# Patient Record
Sex: MALE | Race: WHITE | NOT HISPANIC OR LATINO | URBAN - NONMETROPOLITAN AREA
[De-identification: names, ages, dates, MRNs, and addresses within clinical notes are randomized per-mention and may not be internally consistent; named-entity substitution may affect disease eponyms.]

---

## 2021-05-27 ENCOUNTER — OFFICE VISIT (OUTPATIENT)
Dept: URBAN - NONMETROPOLITAN AREA CLINIC 2 | Facility: CLINIC | Age: 76
End: 2021-05-27
Payer: MEDICARE

## 2021-05-27 ENCOUNTER — WEB ENCOUNTER (OUTPATIENT)
Dept: URBAN - NONMETROPOLITAN AREA CLINIC 2 | Facility: CLINIC | Age: 76
End: 2021-05-27

## 2021-05-27 VITALS
BODY MASS INDEX: 42.43 KG/M2 | TEMPERATURE: 98.9 F | DIASTOLIC BLOOD PRESSURE: 69 MMHG | WEIGHT: 264 LBS | HEART RATE: 81 BPM | HEIGHT: 66 IN | SYSTOLIC BLOOD PRESSURE: 133 MMHG

## 2021-05-27 DIAGNOSIS — N18.30 STAGE 3 CHRONIC KIDNEY DISEASE, UNSPECIFIED WHETHER STAGE 3A OR 3B CKD: ICD-10-CM

## 2021-05-27 DIAGNOSIS — Z87.19 HISTORY OF HEMORRHOIDS: ICD-10-CM

## 2021-05-27 DIAGNOSIS — R59.9 LYMPH NODE ENLARGEMENT: ICD-10-CM

## 2021-05-27 DIAGNOSIS — I48.91 ATRIAL FIBRILLATION, UNSPECIFIED TYPE: ICD-10-CM

## 2021-05-27 DIAGNOSIS — R19.4 CHANGE IN BOWEL HABITS: ICD-10-CM

## 2021-05-27 DIAGNOSIS — R14.0 GASSINESS: ICD-10-CM

## 2021-05-27 DIAGNOSIS — K21.9 GASTROESOPHAGEAL REFLUX DISEASE, UNSPECIFIED WHETHER ESOPHAGITIS PRESENT: ICD-10-CM

## 2021-05-27 PROCEDURE — 99203 OFFICE O/P NEW LOW 30 MIN: CPT | Performed by: INTERNAL MEDICINE

## 2021-05-27 RX ORDER — ATORVASTATIN CALCIUM 10 MG/1
1 TABLET TABLET, FILM COATED ORAL ONCE A DAY
Qty: 30 | Status: ACTIVE | COMMUNITY
Start: 2021-05-28

## 2021-05-27 RX ORDER — OMEPRAZOLE 40 MG/1
1 CAPSULE 30 MINUTES BEFORE MORNING MEAL CAPSULE, DELAYED RELEASE ORAL ONCE A DAY
Qty: 30 | Status: ACTIVE | COMMUNITY
Start: 2021-05-28

## 2021-05-27 RX ORDER — APIXABAN 5 MG/1
AS DIRECTED TABLET, FILM COATED ORAL
Status: ACTIVE | COMMUNITY
Start: 2021-05-28

## 2021-05-27 RX ORDER — LISINOPRIL 40 MG/1
1 TABLET TABLET ORAL ONCE A DAY
Qty: 30 | Status: ACTIVE | COMMUNITY
Start: 2021-05-28

## 2021-05-27 NOTE — HPI-TODAY'S VISIT:
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 a chest CT 4/30/2021 showed an enlarged R cardiophrenic lymph node measuring 17 x 24 mm which was an increase c/w a 9/8/2010 study  showing it to be 12 x 21 mm. pt has several GI c/os has gerd. egd per dr germain 7/2020. was told he had gerd and "we need to keep an eye on it" on omeprazole 40mg qd. has break through 2/7 days. has c/o of gassiness and "rumbling" through his abdomen. no rx for it or w/u. has a change in bms for approx one month. has 2-4 bms/d which are now softer and lighter in color. he has a hx of hemorrhoids and is s/p RBL some recent bld per rectum. colon approx 2014 per dr franco was nl.

## 2021-05-28 PROBLEM — 116289008: Status: ACTIVE | Noted: 2021-05-27

## 2021-05-28 PROBLEM — 16851191000119103: Status: ACTIVE | Noted: 2021-05-27

## 2021-05-28 PROBLEM — 129851009: Status: ACTIVE | Noted: 2021-05-27

## 2021-06-10 PROBLEM — 30746006: Status: ACTIVE | Noted: 2021-05-27

## 2021-07-16 ENCOUNTER — OFFICE VISIT (OUTPATIENT)
Dept: URBAN - METROPOLITAN AREA MEDICAL CENTER 1 | Facility: MEDICAL CENTER | Age: 76
End: 2021-07-16
Payer: MEDICARE

## 2021-07-16 DIAGNOSIS — R93.3 ABN FINDINGS-GI TRACT: ICD-10-CM

## 2021-07-16 PROCEDURE — 43259 EGD US EXAM DUODENUM/JEJUNUM: CPT | Performed by: INTERNAL MEDICINE

## 2021-10-13 ENCOUNTER — OFFICE VISIT (OUTPATIENT)
Dept: URBAN - NONMETROPOLITAN AREA CLINIC 2 | Facility: CLINIC | Age: 76
End: 2021-10-13
Payer: MEDICARE

## 2021-10-13 DIAGNOSIS — N18.30 STAGE 3 CHRONIC KIDNEY DISEASE, UNSPECIFIED WHETHER STAGE 3A OR 3B CKD: ICD-10-CM

## 2021-10-13 DIAGNOSIS — I48.91 ATRIAL FIBRILLATION, UNSPECIFIED TYPE: ICD-10-CM

## 2021-10-13 DIAGNOSIS — R59.9 LYMPH NODE ENLARGEMENT: ICD-10-CM

## 2021-10-13 DIAGNOSIS — K21.9 GASTROESOPHAGEAL REFLUX DISEASE, UNSPECIFIED WHETHER ESOPHAGITIS PRESENT: ICD-10-CM

## 2021-10-13 DIAGNOSIS — R14.0 GASSINESS: ICD-10-CM

## 2021-10-13 DIAGNOSIS — Z87.19 HISTORY OF HEMORRHOIDS: ICD-10-CM

## 2021-10-13 DIAGNOSIS — R19.4 CHANGE IN BOWEL HABITS: ICD-10-CM

## 2021-10-13 PROCEDURE — 99214 OFFICE O/P EST MOD 30 MIN: CPT | Performed by: INTERNAL MEDICINE

## 2021-10-13 RX ORDER — ATORVASTATIN CALCIUM 10 MG/1
1 TABLET TABLET, FILM COATED ORAL ONCE A DAY
Qty: 30 | Status: ACTIVE | COMMUNITY
Start: 2021-05-28

## 2021-10-13 RX ORDER — LISINOPRIL 40 MG/1
1 TABLET TABLET ORAL ONCE A DAY
Qty: 30 | Status: ACTIVE | COMMUNITY
Start: 2021-05-28

## 2021-10-13 RX ORDER — APIXABAN 5 MG/1
AS DIRECTED TABLET, FILM COATED ORAL
Status: ACTIVE | COMMUNITY
Start: 2021-05-28

## 2021-10-13 RX ORDER — OMEPRAZOLE 40 MG/1
1 CAPSULE 30 MINUTES BEFORE MORNING MEAL CAPSULE, DELAYED RELEASE ORAL ONCE A DAY
Qty: 30 | Status: ACTIVE | COMMUNITY
Start: 2021-05-28

## 2021-10-13 NOTE — HPI-TODAY'S VISIT:
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 a chest CT 4/30/2021 showed an enlarged R cardiophrenic lymph node measuring 17 x 24 mm which was an increase c/w a 9/8/2010 study  showing it to be 12 x 21 mm. pt has several GI c/os has gerd. egd per dr germain 7/2020. was told he had gerd and "we need to keep an eye on it" on omeprazole 40mg qd. has break through 2/7 days. has c/o of gassiness and "rumbling" through his abdomen. no rx for it or w/u. has a change in bms for approx one month. has 2-4 bms/d which are now softer and lighter in color. he has a hx of hemorrhoids and is s/p RBL some recent bld per rectum. colon approx 2014 per dr franco was nl. 10/13/2021 Mr. Marshall returns for follow-up. He was initially referred by pulmonary for slightly enlarged cardiophrenic lymph node.  I performed EGD and EUS.  EGD was essentially normal.  EUS showed a vague hypodensity in the cardiophrenic area measuring approximately 1.4 cm in diameter suggestive of a nonpathologic lymph node.  I did not FNA this.  He is here today for follow-up.  He has no GI issues other than gassiness and bloating which she has had for some time.  He denies any shortness of breath or dysphagia.

## 2021-12-29 ENCOUNTER — OFFICE VISIT (OUTPATIENT)
Dept: URBAN - NONMETROPOLITAN AREA CLINIC 2 | Facility: CLINIC | Age: 76
End: 2021-12-29
Payer: MEDICARE

## 2021-12-29 DIAGNOSIS — I48.91 ATRIAL FIBRILLATION, UNSPECIFIED TYPE: ICD-10-CM

## 2021-12-29 DIAGNOSIS — Z87.19 HISTORY OF HEMORRHOIDS: ICD-10-CM

## 2021-12-29 DIAGNOSIS — R59.9 LYMPH NODE ENLARGEMENT: ICD-10-CM

## 2021-12-29 DIAGNOSIS — R19.4 CHANGE IN BOWEL HABITS: ICD-10-CM

## 2021-12-29 DIAGNOSIS — K21.9 GASTROESOPHAGEAL REFLUX DISEASE, UNSPECIFIED WHETHER ESOPHAGITIS PRESENT: ICD-10-CM

## 2021-12-29 DIAGNOSIS — N18.30 STAGE 3 CHRONIC KIDNEY DISEASE, UNSPECIFIED WHETHER STAGE 3A OR 3B CKD: ICD-10-CM

## 2021-12-29 DIAGNOSIS — R14.0 GASSINESS: ICD-10-CM

## 2021-12-29 PROCEDURE — 99214 OFFICE O/P EST MOD 30 MIN: CPT | Performed by: INTERNAL MEDICINE

## 2021-12-29 RX ORDER — LISINOPRIL 40 MG/1
1 TABLET TABLET ORAL ONCE A DAY
Qty: 30 | Status: ACTIVE | COMMUNITY
Start: 2021-05-28

## 2021-12-29 RX ORDER — OMEPRAZOLE 40 MG/1
1 CAPSULE 30 MINUTES BEFORE MORNING MEAL CAPSULE, DELAYED RELEASE ORAL ONCE A DAY
Qty: 30 | Status: ACTIVE | COMMUNITY
Start: 2021-05-28

## 2021-12-29 RX ORDER — ATORVASTATIN CALCIUM 10 MG/1
1 TABLET TABLET, FILM COATED ORAL ONCE A DAY
Qty: 30 | Status: ACTIVE | COMMUNITY
Start: 2021-05-28

## 2021-12-29 RX ORDER — APIXABAN 5 MG/1
AS DIRECTED TABLET, FILM COATED ORAL
Status: ACTIVE | COMMUNITY
Start: 2021-05-28

## 2021-12-29 NOTE — HPI-TODAY'S VISIT:
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 a chest CT 4/30/2021 showed an enlarged R cardiophrenic lymph node measuring 17 x 24 mm which was an increase c/w a 9/8/2010 study  showing it to be 12 x 21 mm. pt has several GI c/os has gerd. egd per dr germain 7/2020. was told he had gerd and "we need to keep an eye on it" on omeprazole 40mg qd. has break through 2/7 days. has c/o of gassiness and "rumbling" through his abdomen. no rx for it or w/u. has a change in bms for approx one month. has 2-4 bms/d which are now softer and lighter in color. he has a hx of hemorrhoids and is s/p RBL some recent bld per rectum. colon approx 2014 per dr franco was nl. 10/13/2021 Mr. Marshall returns for follow-up. He was initially referred by pulmonary for slightly enlarged cardiophrenic lymph node.  I performed EGD and EUS.  EGD was essentially normal.  EUS showed a vague hypodensity in the cardiophrenic area measuring approximately 1.4 cm in diameter suggestive of a nonpathologic lymph node.  I did not FNA this.  He is here today for follow-up.  He has no GI issues other than gassiness and bloating which she has had for some time.  He denies any shortness of breath or dysphagia. 12/29/2021 Mr. Marshall returns for follow-up.  He had EUS and chest imaging earlier this year that showed a small cardiophrenic lymph node.  This has been unchanged for many years.  I was not terribly concerned about it but did feel like we needed to image it 1 more time.  He otherwise feels well.  He does have some bloating and probiotics seem to help with this.  He is currently wearing a heart monitor for some questionable arrhythmia

## 2022-03-30 ENCOUNTER — OFFICE VISIT (OUTPATIENT)
Dept: URBAN - NONMETROPOLITAN AREA CLINIC 2 | Facility: CLINIC | Age: 77
End: 2022-03-30
Payer: MEDICARE

## 2022-03-30 DIAGNOSIS — R59.9 LYMPH NODE ENLARGEMENT: ICD-10-CM

## 2022-03-30 DIAGNOSIS — N18.30 STAGE 3 CHRONIC KIDNEY DISEASE, UNSPECIFIED WHETHER STAGE 3A OR 3B CKD: ICD-10-CM

## 2022-03-30 DIAGNOSIS — K21.9 GASTROESOPHAGEAL REFLUX DISEASE, UNSPECIFIED WHETHER ESOPHAGITIS PRESENT: ICD-10-CM

## 2022-03-30 DIAGNOSIS — Z87.19 HISTORY OF HEMORRHOIDS: ICD-10-CM

## 2022-03-30 DIAGNOSIS — R14.0 GASSINESS: ICD-10-CM

## 2022-03-30 DIAGNOSIS — R19.4 CHANGE IN BOWEL HABITS: ICD-10-CM

## 2022-03-30 DIAGNOSIS — I48.91 ATRIAL FIBRILLATION, UNSPECIFIED TYPE: ICD-10-CM

## 2022-03-30 PROBLEM — 235595009: Status: ACTIVE | Noted: 2021-05-27

## 2022-03-30 PROBLEM — 433144002: Status: ACTIVE | Noted: 2021-05-27

## 2022-03-30 PROBLEM — 49436004: Status: ACTIVE | Noted: 2021-05-27

## 2022-03-30 PROCEDURE — 99214 OFFICE O/P EST MOD 30 MIN: CPT | Performed by: INTERNAL MEDICINE

## 2022-03-30 RX ORDER — ATORVASTATIN CALCIUM 10 MG/1
1 TABLET TABLET, FILM COATED ORAL ONCE A DAY
Qty: 30 | Status: ACTIVE | COMMUNITY
Start: 2021-05-28

## 2022-03-30 RX ORDER — LISINOPRIL 40 MG/1
1 TABLET TABLET ORAL ONCE A DAY
Qty: 30 | Status: ACTIVE | COMMUNITY
Start: 2021-05-28

## 2022-03-30 RX ORDER — APIXABAN 5 MG/1
AS DIRECTED TABLET, FILM COATED ORAL
Status: ACTIVE | COMMUNITY
Start: 2021-05-28

## 2022-03-30 RX ORDER — OMEPRAZOLE 40 MG/1
1 CAPSULE 30 MINUTES BEFORE MORNING MEAL CAPSULE, DELAYED RELEASE ORAL ONCE A DAY
Qty: 30 | Status: ACTIVE | COMMUNITY
Start: 2021-05-28

## 2022-03-30 NOTE — HPI-TODAY'S VISIT:
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 a chest CT 4/30/2021 showed an enlarged R cardiophrenic lymph node measuring 17 x 24 mm which was an increase c/w a 9/8/2010 study  showing it to be 12 x 21 mm. pt has several GI c/os has gerd. egd per dr geramin 7/2020. was told he had gerd and "we need to keep an eye on it" on omeprazole 40mg qd. has break through 2/7 days. has c/o of gassiness and "rumbling" through his abdomen. no rx for it or w/u. has a change in bms for approx one month. has 2-4 bms/d which are now softer and lighter in color. he has a hx of hemorrhoids and is s/p RBL some recent bld per rectum. colon approx 2014 per dr franco was nl. 10/13/2021 Mr. Marshall returns for follow-up. He was initially referred by pulmonary for slightly enlarged cardiophrenic lymph node.  I performed EGD and EUS.  EGD was essentially normal.  EUS showed a vague hypodensity in the cardiophrenic area measuring approximately 1.4 cm in diameter suggestive of a nonpathologic lymph node.  I did not FNA this.  He is here today for follow-up.  He has no GI issues other than gassiness and bloating which she has had for some time.  He denies any shortness of breath or dysphagia. 12/29/2021 Mr. Marshall returns for follow-up.  He had EUS and chest imaging earlier this year that showed a small cardiophrenic lymph node.  This has been unchanged for many years.  I was not terribly concerned about it but did feel like we needed to image it 1 more time.  He otherwise feels well.  He does have some bloating and probiotics seem to help with this.  He is currently wearing a heart monitor for some questionable arrhythmia 3/30/2022 Santos returns for follow-up.  In January he had a repeat CT scan of the chest to evaluate a mediastinal lymph node.  This is remained unchanged in size for the last 10 years.  This causes no problems for him.  His main issue is bloating.  I gave him probiotic samples last visit and these have tremendously helped.  He has no other complaints today.

## 2023-06-22 ENCOUNTER — DASHBOARD ENCOUNTERS (OUTPATIENT)
Age: 78
End: 2023-06-22

## 2023-06-22 ENCOUNTER — LAB OUTSIDE AN ENCOUNTER (OUTPATIENT)
Dept: URBAN - NONMETROPOLITAN AREA CLINIC 13 | Facility: CLINIC | Age: 78
End: 2023-06-22

## 2023-06-22 ENCOUNTER — OFFICE VISIT (OUTPATIENT)
Dept: URBAN - NONMETROPOLITAN AREA CLINIC 13 | Facility: CLINIC | Age: 78
End: 2023-06-22
Payer: MEDICARE

## 2023-06-22 ENCOUNTER — WEB ENCOUNTER (OUTPATIENT)
Dept: URBAN - NONMETROPOLITAN AREA CLINIC 13 | Facility: CLINIC | Age: 78
End: 2023-06-22

## 2023-06-22 VITALS
DIASTOLIC BLOOD PRESSURE: 73 MMHG | BODY MASS INDEX: 40.88 KG/M2 | WEIGHT: 254.4 LBS | SYSTOLIC BLOOD PRESSURE: 152 MMHG | HEIGHT: 66 IN | HEART RATE: 82 BPM

## 2023-06-22 DIAGNOSIS — I48.91 ATRIAL FIBRILLATION, UNSPECIFIED TYPE: ICD-10-CM

## 2023-06-22 DIAGNOSIS — K21.9 GASTROESOPHAGEAL REFLUX DISEASE, UNSPECIFIED WHETHER ESOPHAGITIS PRESENT: ICD-10-CM

## 2023-06-22 DIAGNOSIS — R14.0 GASSINESS: ICD-10-CM

## 2023-06-22 DIAGNOSIS — Z87.19 HISTORY OF HEMORRHOIDS: ICD-10-CM

## 2023-06-22 DIAGNOSIS — R59.9 LYMPH NODE ENLARGEMENT: ICD-10-CM

## 2023-06-22 DIAGNOSIS — N18.30 STAGE 3 CHRONIC KIDNEY DISEASE, UNSPECIFIED WHETHER STAGE 3A OR 3B CKD: ICD-10-CM

## 2023-06-22 DIAGNOSIS — R19.4 CHANGE IN BOWEL HABITS: ICD-10-CM

## 2023-06-22 PROCEDURE — 99214 OFFICE O/P EST MOD 30 MIN: CPT | Performed by: INTERNAL MEDICINE

## 2023-06-22 RX ORDER — OMEPRAZOLE 40 MG/1
1 CAPSULE 30 MINUTES BEFORE MORNING MEAL CAPSULE, DELAYED RELEASE ORAL ONCE A DAY
Qty: 30 | Status: ACTIVE | COMMUNITY
Start: 2021-05-28

## 2023-06-22 RX ORDER — APIXABAN 5 MG/1
AS DIRECTED TABLET, FILM COATED ORAL
Status: ACTIVE | COMMUNITY
Start: 2021-05-28

## 2023-06-22 RX ORDER — ATORVASTATIN CALCIUM 10 MG/1
1 TABLET TABLET, FILM COATED ORAL ONCE A DAY
Qty: 30 | Status: ACTIVE | COMMUNITY
Start: 2021-05-28

## 2023-06-22 RX ORDER — TRAMADOL HYDROCHLORIDE 50 MG/1
1 TABLET AS NEEDED TABLET, FILM COATED ORAL ONCE A DAY
Status: ACTIVE | COMMUNITY

## 2023-06-22 RX ORDER — CALCITRIOL 0.25 UG/1
1 CAPSULE CAPSULE ORAL
Status: ACTIVE | COMMUNITY

## 2023-06-22 RX ORDER — LISINOPRIL 40 MG/1
1 TABLET TABLET ORAL ONCE A DAY
Qty: 30 | Status: ACTIVE | COMMUNITY
Start: 2021-05-28

## 2023-06-22 NOTE — HPI-TODAY'S VISIT:
12/29/2021 Mr. Marshall returns for follow-up.  He had EUS and chest imaging earlier this year that showed a small cardiophrenic lymph node.  This has been unchanged for many years.  I was not terribly concerned about it but did feel like we needed to image it 1 more time.  He otherwise feels well.  He does have some bloating and probiotics seem to help with this.  He is currently wearing a heart monitor for some questionable arrhythmia 3/30/2022 Santos returns for follow-up.  In January he had a repeat CT scan of the chest to evaluate a mediastinal lymph node.  This is remained unchanged in size for the last 10 years.  This causes no problems for him.  His main issue is bloating.  I gave him probiotic samples last visit and these have tremendously helped.  He has no other complaints today. 6/22/2023 Mr. Marshall returns for follow-up.  Imaging in early 2022 suggested a cardiophrenic lymph node that was stable.  He has had EUS.  I did order repeat imaging 1 more time.  His main issue today is constipation.  He has had increasing constipation over the last few weeks.  He does have diabetes.  He has not had any blood in his stool.  He has had routine colonoscopies over the years.  He is not due

## 2023-09-11 NOTE — PHYSICAL EXAM LYMPHATIC:
Neck , no lymphadenopathy Patient's (Body mass index is 27.36 kg/m².) indicates that they are overweight with health conditions that include obstructive sleep apnea, dyslipidemias, and GERD . Weight is worsening. BMI is is above average; BMI management plan is completed. We discussed low calorie, low carb based diet program, portion control, increasing exercise, pharmacologic options including Wegovy, and an dago-based approach such as MyFitness Pal or Lose It.   -- This is a follow-up visit and patient is up around 2-1/2 pounds since last being seen around 2 months ago.  --Currently taking Saxenda but we are switching to Wegovy due to supply shortages.  Prior approval message sent to staff.  --Still working on reaching the goal of 140 or below.  --Be mindful of sweet late night snacks.  -- If she tolerates well at 1.7 for Wegovy our goal is to increase to 2.4 at next office visit.